# Patient Record
Sex: FEMALE | Race: WHITE | NOT HISPANIC OR LATINO
[De-identification: names, ages, dates, MRNs, and addresses within clinical notes are randomized per-mention and may not be internally consistent; named-entity substitution may affect disease eponyms.]

---

## 2017-12-08 ENCOUNTER — TRANSCRIPTION ENCOUNTER (OUTPATIENT)
Age: 82
End: 2017-12-08

## 2018-02-05 ENCOUNTER — TRANSCRIPTION ENCOUNTER (OUTPATIENT)
Age: 83
End: 2018-02-05

## 2018-03-30 ENCOUNTER — TRANSCRIPTION ENCOUNTER (OUTPATIENT)
Age: 83
End: 2018-03-30

## 2018-04-30 ENCOUNTER — APPOINTMENT (OUTPATIENT)
Dept: GERIATRICS | Facility: CLINIC | Age: 83
End: 2018-04-30
Payer: MEDICARE

## 2018-04-30 VITALS
SYSTOLIC BLOOD PRESSURE: 110 MMHG | OXYGEN SATURATION: 98 % | DIASTOLIC BLOOD PRESSURE: 62 MMHG | HEART RATE: 75 BPM | BODY MASS INDEX: 21.34 KG/M2 | HEIGHT: 61 IN | WEIGHT: 113 LBS | TEMPERATURE: 98.6 F

## 2018-04-30 DIAGNOSIS — Z60.2 PROBLEMS RELATED TO LIVING ALONE: ICD-10-CM

## 2018-04-30 DIAGNOSIS — Z71.89 OTHER SPECIFIED COUNSELING: ICD-10-CM

## 2018-04-30 DIAGNOSIS — M17.10 UNILATERAL PRIMARY OSTEOARTHRITIS, UNSPECIFIED KNEE: ICD-10-CM

## 2018-04-30 DIAGNOSIS — M25.519 PAIN IN UNSPECIFIED SHOULDER: ICD-10-CM

## 2018-04-30 DIAGNOSIS — Z00.00 ENCOUNTER FOR GENERAL ADULT MEDICAL EXAMINATION W/OUT ABNORMAL FINDINGS: ICD-10-CM

## 2018-04-30 DIAGNOSIS — J45.909 UNSPECIFIED ASTHMA, UNCOMPLICATED: ICD-10-CM

## 2018-04-30 PROCEDURE — 99205 OFFICE O/P NEW HI 60 MIN: CPT | Mod: GC

## 2018-04-30 RX ORDER — RIVASTIGMINE 9.5 MG/24H
9.5 PATCH, EXTENDED RELEASE TRANSDERMAL
Refills: 0 | Status: ACTIVE | COMMUNITY
Start: 2018-04-30

## 2018-04-30 RX ORDER — LEVOTHYROXINE SODIUM 0.05 MG/1
50 TABLET ORAL DAILY
Refills: 0 | Status: ACTIVE | COMMUNITY
Start: 2018-04-30

## 2018-04-30 RX ORDER — FLUTICASONE PROPIONATE AND SALMETEROL 50; 250 UG/1; UG/1
250-50 POWDER RESPIRATORY (INHALATION)
Qty: 3 | Refills: 3 | Status: ACTIVE | COMMUNITY
Start: 2018-04-30

## 2018-04-30 RX ORDER — FLUTICASONE PROPIONATE 50 UG/1
50 SPRAY, METERED NASAL DAILY
Qty: 1 | Refills: 5 | Status: ACTIVE | COMMUNITY
Start: 2018-04-30

## 2018-04-30 RX ORDER — MONTELUKAST 10 MG/1
10 TABLET, FILM COATED ORAL
Qty: 1 | Refills: 1 | Status: ACTIVE | COMMUNITY
Start: 2018-04-30

## 2018-04-30 SDOH — SOCIAL STABILITY - SOCIAL INSECURITY: PROBLEMS RELATED TO LIVING ALONE: Z60.2

## 2018-05-01 PROBLEM — M17.10 ARTHRITIS OF KNEE: Status: RESOLVED | Noted: 2018-05-01 | Resolved: 2018-05-01

## 2018-05-01 PROBLEM — Z60.2 LIVING ALONE: Status: ACTIVE | Noted: 2018-05-01

## 2018-05-01 PROBLEM — M25.519 SHOULDER PAIN: Status: RESOLVED | Noted: 2018-05-01 | Resolved: 2018-05-01

## 2018-06-06 ENCOUNTER — APPOINTMENT (OUTPATIENT)
Dept: GERIATRICS | Facility: CLINIC | Age: 83
End: 2018-06-06
Payer: MEDICARE

## 2018-06-06 ENCOUNTER — TRANSCRIPTION ENCOUNTER (OUTPATIENT)
Age: 83
End: 2018-06-06

## 2018-06-06 VITALS
HEIGHT: 61 IN | OXYGEN SATURATION: 98 % | RESPIRATION RATE: 15 BRPM | HEART RATE: 71 BPM | WEIGHT: 111.38 LBS | BODY MASS INDEX: 21.03 KG/M2 | TEMPERATURE: 97.4 F | DIASTOLIC BLOOD PRESSURE: 60 MMHG | SYSTOLIC BLOOD PRESSURE: 120 MMHG

## 2018-06-06 DIAGNOSIS — E63.9 NUTRITIONAL DEFICIENCY, UNSPECIFIED: ICD-10-CM

## 2018-06-06 PROCEDURE — 99215 OFFICE O/P EST HI 40 MIN: CPT

## 2018-06-07 PROBLEM — E63.9 POOR DIET: Status: ACTIVE | Noted: 2018-04-30

## 2018-06-27 ENCOUNTER — OUTPATIENT (OUTPATIENT)
Dept: OUTPATIENT SERVICES | Facility: HOSPITAL | Age: 83
LOS: 1 days | End: 2018-06-27
Payer: MEDICARE

## 2018-06-27 ENCOUNTER — APPOINTMENT (OUTPATIENT)
Dept: CT IMAGING | Facility: IMAGING CENTER | Age: 83
End: 2018-06-27

## 2018-06-27 DIAGNOSIS — Z00.8 ENCOUNTER FOR OTHER GENERAL EXAMINATION: ICD-10-CM

## 2018-06-27 PROCEDURE — 74177 CT ABD & PELVIS W/CONTRAST: CPT

## 2018-06-27 PROCEDURE — 74177 CT ABD & PELVIS W/CONTRAST: CPT | Mod: 26

## 2018-06-27 PROCEDURE — 82565 ASSAY OF CREATININE: CPT

## 2018-06-29 ENCOUNTER — APPOINTMENT (OUTPATIENT)
Dept: ORTHOPEDIC SURGERY | Facility: CLINIC | Age: 83
End: 2018-06-29
Payer: MEDICARE

## 2018-06-29 VITALS
DIASTOLIC BLOOD PRESSURE: 75 MMHG | BODY MASS INDEX: 20.96 KG/M2 | HEIGHT: 61 IN | SYSTOLIC BLOOD PRESSURE: 147 MMHG | WEIGHT: 111 LBS | HEART RATE: 72 BPM

## 2018-06-29 PROCEDURE — 99214 OFFICE O/P EST MOD 30 MIN: CPT | Mod: 25

## 2018-06-29 PROCEDURE — 20610 DRAIN/INJ JOINT/BURSA W/O US: CPT | Mod: 50

## 2018-07-22 PROBLEM — J45.909 HAY FEVER WITH ASTHMA: Status: ACTIVE | Noted: 2018-04-30

## 2018-07-28 ENCOUNTER — HOSPITAL ENCOUNTER (EMERGENCY)
Dept: HOSPITAL 25 - UCCORT | Age: 83
Discharge: HOME | End: 2018-07-28
Payer: MEDICARE

## 2018-07-28 DIAGNOSIS — H00.021: ICD-10-CM

## 2018-07-28 DIAGNOSIS — F03.90: ICD-10-CM

## 2018-07-28 DIAGNOSIS — H00.11: Primary | ICD-10-CM

## 2018-07-28 PROCEDURE — G0463 HOSPITAL OUTPT CLINIC VISIT: HCPCS

## 2018-07-28 PROCEDURE — 99203 OFFICE O/P NEW LOW 30 MIN: CPT

## 2018-07-28 NOTE — UC
Eye Complaint HPI





- HPI Summary


HPI Summary: 





PT AND HER DAUGHTER ARE VISITING [place]. PT NOTED SOME ITCHING TO HER RIGHT 

UPPER EYE LID ABOUT 1 WEEK AGO. SHE TRIED TO SELF TX WITH OTC ALLERGY EYE DROPS 

WITH NO RELIEF. OVER THE PAST 2-3 DAYS, SHE NOTED A LITTLE BUMP AND RED SPOT TO 

HER UPPER OUTER LID THAT IS WORSENING. NO EYE PAIN, VISUAL CHANGES OR DISCHARGE.





- History of Current Complaint


Stated Complaint: LEFT EYE COMPLAINT


Time Seen by Provider: 07/28/18 20:13


Hx Obtained From: Patient, Family/Caretaker


Onset/Duration: Gradual Onset


Timing: Constant


Aggravating Factor(s): Nothing


Alleviating Factor(s): Nothing


Associated Signs And Symptoms: Negative: Photophobia, Drainage (Clear), 

Drainage (Purulent), Vision Impairment Bilateral





- Risk Factors


Penetrating Injury Risk Factor: Negative


Globe Rupture Risk Factors: Negative


Acute Glaucoma Risk Factors: Negative





- Allergies/Home Medications


Allergies/Adverse Reactions: 


 Allergies











Allergy/AdvReac Type Severity Reaction Status Date / Time


 


environmental allergies Allergy  Eyes Uncoded 07/28/18 20:25





   Itchy/Swollen/Red/Watery  











Home Medications: 


 Home Medications





Acetaminophen TAB* [Tylenol TAB*] 325 mg PO Q4H PRN 07/28/18 [History Confirmed 

07/28/18]


Albuterol HFA INHALER* [Ventolin HFA Inhaler*] 2 puff INH Q6H PRN 07/28/18 [

History Confirmed 07/28/18]


Ascorbic Acid TAB* [Vitamin C  TAB*] 500 mg PO DAILY 07/28/18 [History 

Confirmed 07/28/18]


Calcium Carb,Gluc/Mag Ox,Gluc [Calcium Magnesium Caplet] 1 each PO DAILY 07/28/ 18 [History Confirmed 07/28/18]


Cyanocobalamin (Vitamin B-12) [Vitamin B-12] 1,000 mcg PO DAILY 07/28/18 [

History Confirmed 07/28/18]


Desloratidine (NF) [Clarinex (NF)] 5 mg PO DAILY 07/28/18 [History Confirmed 07/ 28/18]


Fluticasone NASAL SPRAY 50MCG* [Flonase NASAL SPRAY 50MCG*] 2 spray BOTH NARES 

DAILY 07/28/18 [History Confirmed 07/28/18]


Fluticasone-Salmeterol 100-50* [Advair Diskus 100-50*] 1 puff INH BID 07/28/18 [

History Confirmed 07/28/18]


L.acidoph,Paracasei, B.lactis [Probiotic] 1 each PO DAILY 07/28/18 [History 

Confirmed 07/28/18]


Montelukast Sodium TAB* [Singulair TAB*] 10 mg PO DAILY 07/28/18 [History 

Confirmed 07/28/18]


Multivit/Iron/Folic Acid/Hb179 [Rylan Multi For Women Tab] 1 each PO DAILY 07/28/ 18 [History Confirmed 07/28/18]


Naphazoline/Pheniramine OPTH* [Naphcon-A*] 1 udc RIGHT EYE QID PRN 07/28/18 [

History Confirmed 07/28/18]


Omega-3 Fatty Acids/Fish Oil [Fish Oil 1,000 mg Capsule] 1 each PO DAILY 07/28/ 18 [History Confirmed 07/28/18]


Potassium 99 mg PO DAILY 07/28/18 [History Confirmed 07/28/18]


Pseudoephedrine TAB* [Sudafed TAB*] 30 mg PO Q6H PRN 07/28/18 [History 

Confirmed 07/28/18]


Red Yeast Rice 600 mg PO DAILY 07/28/18 [History Confirmed 07/28/18]


Rivastigmine PATCH 9.5 MG(NF) [Exelon PATCH(NF)] 1 patch TRANSDERM DAILY 07/28/ 18 [History Confirmed 07/28/18]


Vitamin B Complex CAP* [B Complex CAP*] 1 cap PO DAILY 07/28/18 [History 

Confirmed 07/28/18]











PMH/Surg Hx/FS Hx/Imm Hx





- Additional Past Medical History


Additional PMH: 





Allergies. Mild dementia





- Surgical History


Other Surgical History: cataracts





- Social History


Occupation: Retired


Lives: With Family





- Immunization History


Vaccination Up to Date: Yes





Review of Systems


Constitutional: Negative


Skin: Negative


Eyes: Other - R upper lid red and swelling


ENT: Negative


Respiratory: Negative


Cardiovascular: Negative


Gastrointestinal: Negative


Genitourinary: Negative


Motor: Negative


Neurovascular: Negative


Musculoskeletal: Negative


Neurological: Negative


Psychological: Negative


Is Patient Immunocompromised?: No


All Other Systems Reviewed And Are Negative: Yes





Physical Exam


Triage Information Reviewed: Yes


Appearance: Well-Appearing


Vital Signs Reviewed: Yes


Eyes: Positive: Other: - R upper lateral lid with small area of swelling and 

mild local erythema. There is no circumfrential swelling or erythema.  lids 

everted and no FB's. AC's clear. conjunctiva are clear. pupils s/p cataract. 

eomi. no auricular adenopathy.


ENT: Positive: Pharynx normal, TMs normal, Other - hearing aides removed and re[

laced by pt for ear exams..  Negative: Nasal congestion, Nasal drainage


Neck: Positive: Supple, Nontender, No Lymphadenopathy


Respiratory: Positive: Lungs clear, Normal breath sounds


Cardiovascular: Positive: RRR, No Murmur


Abdomen Description: Positive: Nontender, No Organomegaly, Soft


Bowel Sounds: Positive: Present


Musculoskeletal: Positive: ROM Intact


Neurological: Positive: Alert


Psychological: Positive: Normal Response To Family, Age Appropriate Behavior


Skin Exam: Normal





Eye Complaint Course/Dx





- Course


Course Of Treatment: no concern for orbital cellulitis. exam is c/w a chalazion 

and very localized secondary skin infecion. will tx with topical and po 

antibiotics. pt and daughter agree to stop the otc drops since not helping.  pt 

in area for about 7-10 days thus referal given to a local eye physician for a 

recheck.  pt and daughter advise, go to ER for changes or worsening.





- Differential Dx/Diagnosis


Provider Diagnoses: Chalazion R upper lid with secondary local infection.





Discharge





- Sign-Out/Discharge


Documenting (check all that apply): Patient Departure





- Discharge Plan


Condition: Stable


Disposition: HOME


Prescriptions: 


Amoxicillin/Clavulanate TAB* [Augmentin *] 875 mg PO BID #13 tab


Patient Education Materials:  Chalazion (ED)


Referrals: 


Ariana Alonzo MD [Medical Doctor] - 


Additional Instructions: 


CALL THE OFFICE OF DR ALONZO AND MAKE AN APPOINTMENT FOR A RECHECK IN 2-3 

DAYS.





GO TO THE ER FOR ANY WORSENING.





USE A TINY STRIP OF THE ERYTHROMYCIN EYE OINTMENT IN THE RIGHT LOWER LID 3X'S 

DAILY FOR 7 DAYS.





- Billing Disposition and Condition


Condition: STABLE


Disposition: Home

## 2018-08-06 ENCOUNTER — APPOINTMENT (OUTPATIENT)
Dept: GERIATRICS | Facility: CLINIC | Age: 83
End: 2018-08-06
Payer: MEDICARE

## 2018-08-06 ENCOUNTER — APPOINTMENT (OUTPATIENT)
Dept: ORTHOPEDIC SURGERY | Facility: CLINIC | Age: 83
End: 2018-08-06

## 2018-08-06 VITALS
HEIGHT: 61 IN | RESPIRATION RATE: 15 BRPM | TEMPERATURE: 97.6 F | OXYGEN SATURATION: 97 % | SYSTOLIC BLOOD PRESSURE: 100 MMHG | WEIGHT: 110 LBS | HEART RATE: 69 BPM | BODY MASS INDEX: 20.77 KG/M2 | DIASTOLIC BLOOD PRESSURE: 60 MMHG

## 2018-08-06 DIAGNOSIS — H00.11 CHALAZION RIGHT UPPER EYELID: ICD-10-CM

## 2018-08-06 PROCEDURE — 99214 OFFICE O/P EST MOD 30 MIN: CPT | Mod: GC

## 2018-08-06 RX ORDER — DOXYCYCLINE HYCLATE 100 MG/1
100 CAPSULE ORAL
Qty: 20 | Refills: 0 | Status: DISCONTINUED | COMMUNITY
Start: 2018-02-05 | End: 2018-08-06

## 2018-08-06 RX ORDER — DESLORATADINE 5 MG/1
5 TABLET, FILM COATED ORAL DAILY
Refills: 0 | Status: ACTIVE | COMMUNITY
Start: 2018-04-30

## 2018-08-06 RX ORDER — PSEUDOEPHEDRINE HCL 30 MG
30 TABLET ORAL
Refills: 0 | Status: DISCONTINUED | COMMUNITY
Start: 2018-04-30 | End: 2018-08-06

## 2018-08-06 RX ORDER — ALBUTEROL SULFATE 90 UG/1
108 (90 BASE) AEROSOL, METERED RESPIRATORY (INHALATION)
Qty: 1 | Refills: 0 | Status: ACTIVE | COMMUNITY
Start: 2018-04-30

## 2018-08-07 LAB
ALBUMIN SERPL ELPH-MCNC: 4.3 G/DL
ALP BLD-CCNC: 60 U/L
ALT SERPL-CCNC: 11 U/L
ANION GAP SERPL CALC-SCNC: 14 MMOL/L
AST SERPL-CCNC: 20 U/L
BASOPHILS # BLD AUTO: 0.02 K/UL
BASOPHILS NFR BLD AUTO: 0.3 %
BILIRUB SERPL-MCNC: 0.4 MG/DL
BUN SERPL-MCNC: 21 MG/DL
CALCIUM SERPL-MCNC: 9.5 MG/DL
CHLORIDE SERPL-SCNC: 106 MMOL/L
CHOLEST SERPL-MCNC: 244 MG/DL
CHOLEST/HDLC SERPL: 3.4 RATIO
CO2 SERPL-SCNC: 26 MMOL/L
CREAT SERPL-MCNC: 0.81 MG/DL
EOSINOPHIL # BLD AUTO: 0.04 K/UL
EOSINOPHIL NFR BLD AUTO: 0.6 %
FOLATE SERPL-MCNC: 15.2 NG/ML
GLUCOSE SERPL-MCNC: 97 MG/DL
HBA1C MFR BLD HPLC: 5.6 %
HCT VFR BLD CALC: 39 %
HDLC SERPL-MCNC: 72 MG/DL
HGB BLD-MCNC: 12.9 G/DL
IMM GRANULOCYTES NFR BLD AUTO: 0.6 %
LDLC SERPL CALC-MCNC: 156 MG/DL
LYMPHOCYTES # BLD AUTO: 1.35 K/UL
LYMPHOCYTES NFR BLD AUTO: 20 %
MAN DIFF?: NORMAL
MCHC RBC-ENTMCNC: 29.7 PG
MCHC RBC-ENTMCNC: 33.1 GM/DL
MCV RBC AUTO: 89.9 FL
MONOCYTES # BLD AUTO: 0.92 K/UL
MONOCYTES NFR BLD AUTO: 13.6 %
NEUTROPHILS # BLD AUTO: 4.39 K/UL
NEUTROPHILS NFR BLD AUTO: 64.9 %
PLATELET # BLD AUTO: 186 K/UL
POTASSIUM SERPL-SCNC: 4.5 MMOL/L
PROT SERPL-MCNC: 6.6 G/DL
RBC # BLD: 4.34 M/UL
RBC # FLD: 14.9 %
SODIUM SERPL-SCNC: 146 MMOL/L
TRIGL SERPL-MCNC: 79 MG/DL
TSH SERPL-ACNC: 2.27 UIU/ML
VIT B12 SERPL-MCNC: 658 PG/ML
WBC # FLD AUTO: 6.76 K/UL

## 2018-10-24 ENCOUNTER — APPOINTMENT (OUTPATIENT)
Dept: GERIATRICS | Facility: CLINIC | Age: 83
End: 2018-10-24

## 2019-01-08 ENCOUNTER — APPOINTMENT (OUTPATIENT)
Dept: ORTHOPEDIC SURGERY | Facility: CLINIC | Age: 84
End: 2019-01-08
Payer: MEDICARE

## 2019-01-08 VITALS — HEIGHT: 61 IN | WEIGHT: 110 LBS | BODY MASS INDEX: 20.77 KG/M2

## 2019-01-08 DIAGNOSIS — M19.012 PRIMARY OSTEOARTHRITIS, LEFT SHOULDER: ICD-10-CM

## 2019-01-08 PROCEDURE — 20610 DRAIN/INJ JOINT/BURSA W/O US: CPT | Mod: LT

## 2019-01-08 PROCEDURE — 99213 OFFICE O/P EST LOW 20 MIN: CPT | Mod: 25

## 2019-01-08 NOTE — DISCUSSION/SUMMARY
[de-identified] : The underlying pathophysiology was reviewed in great detail with the patient as well as the various treatment options, including ice, analgesics, NSAIDs, Physical therapy, steroid injections, TSA.\par \par The patient wishes to proceed with an INJECTION of the left shoulder.\par \par FU 2-3 weeks for corticosteroid injections of the knees bilaterally.

## 2019-01-08 NOTE — PHYSICAL EXAM
[Normal RUE] : Right Upper Extremity: No scars, rashes, lesions, ulcers, skin intact [Normal LUE] : Left Upper Extremity: No scars, rashes, lesions, ulcers, skin intact [Normal Touch] : sensation intact for touch [Normal] : No swelling, no edema, normal pedal pulses and normal temperature [Poor Appearance] : well-appearing [Acute Distress] : not in acute distress [Obese] : not obese [de-identified] : Left Upper Extremity\par o Shoulder :\par ¦ Inspection/Palpation : no tenderness to palpation, no swelling, no deformity \par ¦ Range of Motion : ACTIVE FORWARD ELEVATION: Measured at 70 degrees, ACTIVE EXTERNAL ROTATION: Measured at 25 degrees, ACTIVE INTERNAL ROTATION: Measured at L1. Crepitus on ROM. \par ¦ Strength : external rotation 5/5, internal rotation 5/5, supraspinatus 5/5 \par ¦ Stability : no joint instability on provocative testing\par o Upper Arm : no tenderness, no swelling, no deformities\par o Muscle Bulk : no atrophy \par o Sensation : sensation intact to light touch \par o Skin : no skin rash, no discoloration \par o Vascular Exam : no edema, no cyanosis, radial and ulnar pulses normal

## 2019-01-08 NOTE — PROCEDURE
[de-identified] : At this point I recommended a therapeutic injection and under sterile precautions an injection of 4 cc 1% lidocaine with 0.5 cc of Kenalog and 0.5 cc of Dexamethasone- was placed into the glenohumeral joint of the Left shoulder without complication, and after several minutes, the patient felt significant relief.

## 2019-01-08 NOTE — ADDENDUM
[FreeTextEntry1] : I, Bere Hagan, acted solely as a scribe for Dr. Dva Barkley on this date 01/08/2019 .

## 2019-01-08 NOTE — HISTORY OF PRESENT ILLNESS
[de-identified] : 83 year old female presents for an evaluation of left shoulder pain. She has been diagnosed with advanced osteoarthritis of the glenohumeral joint. Today she notes that her pain has worsened since her last visit and that her pain is of a constant aching quality and exacerbated with all movement of her left shoulder. She has been treated with corticosteroid injections in the past and states that they have provided her with relief from her symptoms. She is interested in receiving a corticosteroid injection of the left shoulder today.

## 2019-01-08 NOTE — END OF VISIT
[FreeTextEntry3] : All medical record entries made by the Arturoibtruman were at my, Dr. Dav Barkley, direction and personally dictated by me on 01/08/2019. I have reviewed the chart and agree that the record accurately reflects my personal performance of the history, physical exam, assessment and plan. I have also personally directed, reviewed, and agreed with the chart.

## 2019-01-31 ENCOUNTER — TRANSCRIPTION ENCOUNTER (OUTPATIENT)
Age: 84
End: 2019-01-31

## 2019-02-04 ENCOUNTER — APPOINTMENT (OUTPATIENT)
Dept: ORTHOPEDIC SURGERY | Facility: CLINIC | Age: 84
End: 2019-02-04
Payer: MEDICARE

## 2019-02-04 DIAGNOSIS — S81.811A LACERATION W/OUT FOREIGN BODY, RIGHT LOWER LEG, INITIAL ENCOUNTER: ICD-10-CM

## 2019-02-04 DIAGNOSIS — M17.0 BILATERAL PRIMARY OSTEOARTHRITIS OF KNEE: ICD-10-CM

## 2019-02-04 PROCEDURE — 99213 OFFICE O/P EST LOW 20 MIN: CPT

## 2019-02-04 NOTE — PHYSICAL EXAM
[Normal RUE] : Right Upper Extremity: No scars, rashes, lesions, ulcers, skin intact [Normal LUE] : Left Upper Extremity: No scars, rashes, lesions, ulcers, skin intact [Normal Touch] : sensation intact for touch [Normal] : No swelling, no edema, normal pedal pulses and normal temperature [Poor Appearance] : well-appearing [Acute Distress] : not in acute distress [Obese] : not obese [de-identified] : Right Lower Extremity\par o Tibia/Fibula :\par ¦ Inspection/Palpation : no tenderness to palpation, Swelling about the ankle, no deformity, skin tear along the anterior proximal tibia, no erythema or discharge\par ¦ Stability : no instability present on provocative testing\par o Muscle Bulk : normal muscle bulk present\par o Skin : erythema and ecchymosis surrounding the skin tear\par o Sensation : sensation to pin intact\par o Vascular Exam : no edema, no cyanosis, dorsalis pedis artery pulse 2+, posterior tibial artery pulse 2+

## 2019-02-04 NOTE — END OF VISIT
[FreeTextEntry3] : All medical record entries made by the Arturoibe were at my, Dr. Dav Barkley, direction and personally dictated by me on 02/04/2019. I have reviewed the chart and agree that the record accurately reflects my personal performance of the history, physical exam, assessment and plan. I have also personally directed, reviewed, and agreed with the chart.

## 2019-02-04 NOTE — HISTORY OF PRESENT ILLNESS
[de-identified] : 83 year old female presents for an evaluation of right leg pain, she is accompanied by her sister. She has been diagnosed with advanced osteoarthritis of the glenohumeral joint and at her last visit she received a corticosteroid injection of her left shoulder and reports some improvements in her symptoms. Pt notes that she fell on 2/1/2019 and sustained an impact to her lower leg that resulted in a skin tear. She notes swelling of her right lower leg though she is still able to walk and climb stairs without pain. Today she has the wound dressed and says that it has been healing well.

## 2019-02-04 NOTE — ADDENDUM
[FreeTextEntry1] : I, Bere Hagan, acted solely as a scribe for Dr. Dav Barkley on this date 02/04/2019 .

## 2019-02-04 NOTE — DISCUSSION/SUMMARY
[de-identified] : The underlying pathophysiology was reviewed in great detail with the patient as well as the various treatment options, including ice, analgesics, NSAIDs, Physical therapy, steroid injections, left TSA.\par \par Skin tear of the left lower leg was cleaned and dressed. She was instructed to keep it clean and dry while it is healing.\par \par FU PRN.

## 2019-02-06 ENCOUNTER — APPOINTMENT (OUTPATIENT)
Dept: GERIATRICS | Facility: CLINIC | Age: 84
End: 2019-02-06
Payer: MEDICARE

## 2019-02-06 VITALS
TEMPERATURE: 98.3 F | HEART RATE: 83 BPM | SYSTOLIC BLOOD PRESSURE: 120 MMHG | RESPIRATION RATE: 15 BRPM | WEIGHT: 112 LBS | DIASTOLIC BLOOD PRESSURE: 70 MMHG | OXYGEN SATURATION: 96 % | BODY MASS INDEX: 21.14 KG/M2 | HEIGHT: 61 IN

## 2019-02-06 DIAGNOSIS — E78.5 HYPERLIPIDEMIA, UNSPECIFIED: ICD-10-CM

## 2019-02-06 DIAGNOSIS — E03.9 HYPOTHYROIDISM, UNSPECIFIED: ICD-10-CM

## 2019-02-06 DIAGNOSIS — J45.40 MODERATE PERSISTENT ASTHMA, UNCOMPLICATED: ICD-10-CM

## 2019-02-06 DIAGNOSIS — G25.0 ESSENTIAL TREMOR: ICD-10-CM

## 2019-02-06 DIAGNOSIS — J30.89 OTHER ALLERGIC RHINITIS: ICD-10-CM

## 2019-02-06 DIAGNOSIS — T14.8XXA OTHER INJURY OF UNSPECIFIED BODY REGION, INITIAL ENCOUNTER: ICD-10-CM

## 2019-02-06 DIAGNOSIS — J30.2 OTHER ALLERGIC RHINITIS: ICD-10-CM

## 2019-02-06 PROCEDURE — 99214 OFFICE O/P EST MOD 30 MIN: CPT | Mod: GC

## 2019-02-06 RX ORDER — ATORVASTATIN CALCIUM 10 MG/1
10 TABLET, FILM COATED ORAL
Qty: 30 | Refills: 0 | Status: ACTIVE | COMMUNITY
Start: 2019-02-06 | End: 1900-01-01

## 2019-02-06 NOTE — HISTORY OF PRESENT ILLNESS
[0] : 2) Feeling down, depressed, or hopeless: Not at all [FreeTextEntry1] :  This is an 82 yo F patient who comes today with ousmane, her sister for follow up. She have PMH significant for Dementia, OA, hypothyroidism and asthma.\par \par Fall: Pt fell down on feb 3rd. She says that she walks daily and goes up and down the stair in her building. While she was exercising her foot got caught in one of the stair and she fell. She denies trauma to head but she got lacerations in her Right upper and lower ext. She was taken to the Urgent care where wound care was done and she was prescribed with keflex 500 q 8 hrs for 5 days, today is day 3.  She denies dizziness or palpitation at the moment of the fall. \par \par Tremors: Patient has left hand tremors for several years,   As per sister the tremor is getting worse, is just in the left hand and intentional tremor. It worsen as the day progress, however patient says that it really does not bother her and she is able to do her daily activities with the tremors; she says that she would prefer not to be started on any medication unless she really need it and relates the tremors with her shoulder pain. Her father had also tremors.\par \par she was seen by PMD/cardio last month and labs showed high cholesterol so started on statin.

## 2019-02-06 NOTE — ASSESSMENT
[FreeTextEntry1] : adv to get labresults fax here to review [Minimize falling] : use grab bars, anti- slip rugs, and proper shoes to minimize falling [Daily physical exercise as tolerated] : Daily physical exercise as tolerated [Medication Management] : medication management [Lab Results] : lab results

## 2019-02-06 NOTE — REASON FOR VISIT
[Follow-Up] : a follow-up visit [Family Member] : family member [Pre-Visit Preparation] : pre-visit preparation was not done [Intercurrent Specialty/Sub-specialty Visits] : the patient has no intercurrent specialty/sub-specialty visits

## 2019-02-06 NOTE — PHYSICAL EXAM
[General Appearance - Alert] : alert [General Appearance - In No Acute Distress] : in no acute distress [Heart Rate And Rhythm] : heart rate was normal and rhythm regular [Heart Sounds] : normal S1 and S2 [Bowel Sounds] : normal bowel sounds [Abdomen Soft] : soft [Abdomen Tenderness] : non-tender [Respiration, Rhythm And Depth] : normal respiratory rhythm and effort [Auscultation Breath Sounds / Voice Sounds] : lungs were clear to auscultation bilaterally [No Focal Deficits] : no focal deficits [Sclera] : the sclera and conjunctiva were normal [Normal Oral Mucosa] : normal oral mucosa [Neck Appearance] : the appearance of the neck was normal [Edema] : there was no peripheral edema [No Spinal Tenderness] : no spinal tenderness [Affect] : the affect was normal [FreeTextEntry1] : left hand intentional tremors

## 2019-02-25 ENCOUNTER — TRANSCRIPTION ENCOUNTER (OUTPATIENT)
Age: 84
End: 2019-02-25

## 2019-03-11 ENCOUNTER — TRANSCRIPTION ENCOUNTER (OUTPATIENT)
Age: 84
End: 2019-03-11

## 2019-03-12 ENCOUNTER — APPOINTMENT (OUTPATIENT)
Dept: GERIATRICS | Facility: CLINIC | Age: 84
End: 2019-03-12
Payer: MEDICARE

## 2019-03-12 VITALS
OXYGEN SATURATION: 95 % | DIASTOLIC BLOOD PRESSURE: 70 MMHG | HEART RATE: 83 BPM | BODY MASS INDEX: 21.14 KG/M2 | TEMPERATURE: 97.6 F | SYSTOLIC BLOOD PRESSURE: 120 MMHG | WEIGHT: 112 LBS | RESPIRATION RATE: 15 BRPM | HEIGHT: 61 IN

## 2019-03-12 DIAGNOSIS — R41.82 ALTERED MENTAL STATUS, UNSPECIFIED: ICD-10-CM

## 2019-03-12 DIAGNOSIS — F03.90 UNSPECIFIED DEMENTIA W/OUT BEHAVIORAL DISTURBANCE: ICD-10-CM

## 2019-03-12 LAB
ALBUMIN SERPL ELPH-MCNC: 4.5 G/DL
ALP BLD-CCNC: 70 U/L
ALT SERPL-CCNC: 14 U/L
ANION GAP SERPL CALC-SCNC: 12 MMOL/L
AST SERPL-CCNC: 21 U/L
BILIRUB SERPL-MCNC: 0.5 MG/DL
BUN SERPL-MCNC: 23 MG/DL
CALCIUM SERPL-MCNC: 9.7 MG/DL
CHLORIDE SERPL-SCNC: 106 MMOL/L
CO2 SERPL-SCNC: 27 MMOL/L
CREAT SERPL-MCNC: 0.83 MG/DL
GLUCOSE SERPL-MCNC: 104 MG/DL
POTASSIUM SERPL-SCNC: 4.1 MMOL/L
PROT SERPL-MCNC: 7 G/DL
SODIUM SERPL-SCNC: 145 MMOL/L

## 2019-03-12 PROCEDURE — 99214 OFFICE O/P EST MOD 30 MIN: CPT

## 2019-03-12 RX ORDER — CEPHALEXIN 500 MG/1
500 CAPSULE ORAL 3 TIMES DAILY
Qty: 15 | Refills: 0 | Status: COMPLETED | COMMUNITY
Start: 2019-02-03 | End: 2019-03-12

## 2019-03-12 NOTE — PHYSICAL EXAM
[General Appearance - Alert] : alert [General Appearance - In No Acute Distress] : in no acute distress [Sclera] : the sclera and conjunctiva were normal [Neck Appearance] : the appearance of the neck was normal [Respiration, Rhythm And Depth] : normal respiratory rhythm and effort [Auscultation Breath Sounds / Voice Sounds] : lungs were clear to auscultation bilaterally [Heart Rate And Rhythm] : heart rate was normal and rhythm regular [Heart Sounds] : normal S1 and S2 [Edema] : there was no peripheral edema [Bowel Sounds] : normal bowel sounds [Abdomen Soft] : soft [Abdomen Tenderness] : non-tender [No Focal Deficits] : no focal deficits [Extraocular Movements] : extraocular movements were intact [Outer Ear] : the ears and nose were normal in appearance [No CVA Tenderness] : no ~M costovertebral angle tenderness [Involuntary Movements] : no involuntary movements were seen [Motor Tone] : muscle strength and tone were normal [Skin Color & Pigmentation] : normal skin color and pigmentation [] : no rash [Hearing Threshold Finger Rub Not [place]] : hearing was normal [FreeTextEntry1] : oral mucosa dry

## 2019-03-12 NOTE — HISTORY OF PRESENT ILLNESS
[FreeTextEntry1] : 84 y/o female with PMH of dementia without behavior disturbance presenting with close friend Anup due to concern for acute change in mental status over the weekend. Sister present over the phone. \par \par Her sister called over the weekend because she called her mom and patient was asking questions as to where she is, what day it is, and had repetition of all questions and 'stuck on one subject'. She was a 'bad episode of confusion'. She was advised to make an appt and come in. As per family friend, she has gone to back to baseline. No trauma.  Has been eating and drinking well. No urinary symptoms. No URI symptoms. No diarrhea or constipation. No rash or ulcers.\par \par Of note, she was treated for UTI with keflex  500 q 8 hrs for 5 days in Feb. \par \par Pt agitated, has an aide with her, but pt annoyed we are here visiting with pt.

## 2019-03-12 NOTE — ASSESSMENT
[Regular activities] : regular physical, social and mental activities [Minimize falling] : use grab bars, anti- slip rugs, and proper shoes to minimize falling [Medication Management] : medication management [FreeTextEntry1] : 84 yo female here bc of change in mental status over past thrreee days but now pt at baseline.Pt rerusing help and wants to leave. No pain, cough, sob or UTI symptomes.\par \par 1) Dementia with behavioral change - check labs, associeated with UTI? CHeck uringe for tx\par 2) Urinary frequence - check urne and labs \par 3) Falls - pt has new age working 2 days ago\par 4) Home safety - pt too agitated to do MMSE today. SHould repeat in future. Pt is alone for sometime, pt will need assistance at home, but refusing.  COns[facility] Assistant living. Only family [place] out of state.\par \par Will discuss with Dr. Green.  Await lab results. [FreeTextEntry4] : Not discussed on today's visit.

## 2019-03-13 LAB
BASOPHILS # BLD AUTO: 0.05 K/UL
BASOPHILS NFR BLD AUTO: 0.6 %
EOSINOPHIL # BLD AUTO: 0.02 K/UL
EOSINOPHIL NFR BLD AUTO: 0.2 %
HCT VFR BLD CALC: 44.4 %
HGB BLD-MCNC: 13.9 G/DL
IMM GRANULOCYTES NFR BLD AUTO: 0.9 %
LYMPHOCYTES # BLD AUTO: 1.45 K/UL
LYMPHOCYTES NFR BLD AUTO: 16.9 %
MAN DIFF?: NORMAL
MCHC RBC-ENTMCNC: 29.1 PG
MCHC RBC-ENTMCNC: 31.3 GM/DL
MCV RBC AUTO: 93.1 FL
MONOCYTES # BLD AUTO: 0.78 K/UL
MONOCYTES NFR BLD AUTO: 9.1 %
NEUTROPHILS # BLD AUTO: 6.19 K/UL
NEUTROPHILS NFR BLD AUTO: 72.3 %
PLATELET # BLD AUTO: 291 K/UL
RBC # BLD: 4.77 M/UL
RBC # FLD: 13.7 %
WBC # FLD AUTO: 8.57 K/UL

## 2019-03-16 ENCOUNTER — TRANSCRIPTION ENCOUNTER (OUTPATIENT)
Age: 84
End: 2019-03-16

## 2019-03-16 ENCOUNTER — INPATIENT (INPATIENT)
Facility: HOSPITAL | Age: 84
LOS: 1 days | Discharge: HOME | DRG: 314 | End: 2019-03-18
Attending: HOSPITALIST | Admitting: HOSPITALIST
Payer: MEDICARE

## 2019-03-16 VITALS
TEMPERATURE: 98 F | DIASTOLIC BLOOD PRESSURE: 82 MMHG | HEART RATE: 77 BPM | SYSTOLIC BLOOD PRESSURE: 140 MMHG | RESPIRATION RATE: 20 BRPM | OXYGEN SATURATION: 97 %

## 2019-03-16 DIAGNOSIS — R07.9 CHEST PAIN, UNSPECIFIED: ICD-10-CM

## 2019-03-16 LAB
ALBUMIN SERPL ELPH-MCNC: 4.3 G/DL — SIGNIFICANT CHANGE UP (ref 3.3–5)
ALP SERPL-CCNC: 68 U/L — SIGNIFICANT CHANGE UP (ref 40–120)
ALT FLD-CCNC: 14 U/L — SIGNIFICANT CHANGE UP (ref 10–45)
ANION GAP SERPL CALC-SCNC: 13 MMOL/L — SIGNIFICANT CHANGE UP (ref 5–17)
APPEARANCE UR: CLEAR — SIGNIFICANT CHANGE UP
APTT BLD: 26.4 SEC — LOW (ref 27.5–36.3)
AST SERPL-CCNC: 20 U/L — SIGNIFICANT CHANGE UP (ref 10–40)
BACTERIA # UR AUTO: NEGATIVE — SIGNIFICANT CHANGE UP
BASOPHILS # BLD AUTO: 0.1 K/UL — SIGNIFICANT CHANGE UP (ref 0–0.2)
BASOPHILS NFR BLD AUTO: 0.8 % — SIGNIFICANT CHANGE UP (ref 0–2)
BILIRUB SERPL-MCNC: 0.6 MG/DL — SIGNIFICANT CHANGE UP (ref 0.2–1.2)
BILIRUB UR-MCNC: NEGATIVE — SIGNIFICANT CHANGE UP
BUN SERPL-MCNC: 19 MG/DL — SIGNIFICANT CHANGE UP (ref 7–23)
CALCIUM SERPL-MCNC: 9.8 MG/DL — SIGNIFICANT CHANGE UP (ref 8.4–10.5)
CHLORIDE SERPL-SCNC: 106 MMOL/L — SIGNIFICANT CHANGE UP (ref 96–108)
CO2 SERPL-SCNC: 24 MMOL/L — SIGNIFICANT CHANGE UP (ref 22–31)
COLOR SPEC: YELLOW — SIGNIFICANT CHANGE UP
CREAT SERPL-MCNC: 0.8 MG/DL — SIGNIFICANT CHANGE UP (ref 0.5–1.3)
DIFF PNL FLD: NEGATIVE — SIGNIFICANT CHANGE UP
EOSINOPHIL # BLD AUTO: 0.1 K/UL — SIGNIFICANT CHANGE UP (ref 0–0.5)
EOSINOPHIL NFR BLD AUTO: 0.8 % — SIGNIFICANT CHANGE UP (ref 0–6)
EPI CELLS # UR: 4 /HPF — SIGNIFICANT CHANGE UP
GLUCOSE SERPL-MCNC: 106 MG/DL — HIGH (ref 70–99)
GLUCOSE UR QL: NEGATIVE — SIGNIFICANT CHANGE UP
HCT VFR BLD CALC: 41.7 % — SIGNIFICANT CHANGE UP (ref 34.5–45)
HGB BLD-MCNC: 13.8 G/DL — SIGNIFICANT CHANGE UP (ref 11.5–15.5)
HYALINE CASTS # UR AUTO: 5 /LPF — HIGH (ref 0–2)
INR BLD: 1.03 RATIO — SIGNIFICANT CHANGE UP (ref 0.88–1.16)
KETONES UR-MCNC: NEGATIVE — SIGNIFICANT CHANGE UP
LEUKOCYTE ESTERASE UR-ACNC: ABNORMAL
LYMPHOCYTES # BLD AUTO: 2 K/UL — SIGNIFICANT CHANGE UP (ref 1–3.3)
LYMPHOCYTES # BLD AUTO: 21.2 % — SIGNIFICANT CHANGE UP (ref 13–44)
MCHC RBC-ENTMCNC: 29.8 PG — SIGNIFICANT CHANGE UP (ref 27–34)
MCHC RBC-ENTMCNC: 33 GM/DL — SIGNIFICANT CHANGE UP (ref 32–36)
MCV RBC AUTO: 90.2 FL — SIGNIFICANT CHANGE UP (ref 80–100)
MONOCYTES # BLD AUTO: 0.9 K/UL — SIGNIFICANT CHANGE UP (ref 0–0.9)
MONOCYTES NFR BLD AUTO: 9.8 % — SIGNIFICANT CHANGE UP (ref 2–14)
NEUTROPHILS # BLD AUTO: 6.2 K/UL — SIGNIFICANT CHANGE UP (ref 1.8–7.4)
NEUTROPHILS NFR BLD AUTO: 67.4 % — SIGNIFICANT CHANGE UP (ref 43–77)
NITRITE UR-MCNC: NEGATIVE — SIGNIFICANT CHANGE UP
PH UR: 5.5 — SIGNIFICANT CHANGE UP (ref 5–8)
PLATELET # BLD AUTO: 244 K/UL — SIGNIFICANT CHANGE UP (ref 150–400)
POTASSIUM SERPL-MCNC: 4.1 MMOL/L — SIGNIFICANT CHANGE UP (ref 3.5–5.3)
POTASSIUM SERPL-SCNC: 4.1 MMOL/L — SIGNIFICANT CHANGE UP (ref 3.5–5.3)
PROT SERPL-MCNC: 6.9 G/DL — SIGNIFICANT CHANGE UP (ref 6–8.3)
PROT UR-MCNC: ABNORMAL
PROTHROM AB SERPL-ACNC: 11.9 SEC — SIGNIFICANT CHANGE UP (ref 10–12.9)
RBC # BLD: 4.62 M/UL — SIGNIFICANT CHANGE UP (ref 3.8–5.2)
RBC # FLD: 12.3 % — SIGNIFICANT CHANGE UP (ref 10.3–14.5)
RBC CASTS # UR COMP ASSIST: 4 /HPF — SIGNIFICANT CHANGE UP (ref 0–4)
SODIUM SERPL-SCNC: 143 MMOL/L — SIGNIFICANT CHANGE UP (ref 135–145)
SP GR SPEC: 1.03 — HIGH (ref 1.01–1.02)
TROPONIN T, HIGH SENSITIVITY RESULT: 10 NG/L — SIGNIFICANT CHANGE UP (ref 0–51)
UROBILINOGEN FLD QL: NEGATIVE — SIGNIFICANT CHANGE UP
WBC # BLD: 9.2 K/UL — SIGNIFICANT CHANGE UP (ref 3.8–10.5)
WBC # FLD AUTO: 9.2 K/UL — SIGNIFICANT CHANGE UP (ref 3.8–10.5)
WBC UR QL: 9 /HPF — HIGH (ref 0–5)

## 2019-03-16 PROCEDURE — 71045 X-RAY EXAM CHEST 1 VIEW: CPT | Mod: 26

## 2019-03-16 PROCEDURE — 99285 EMERGENCY DEPT VISIT HI MDM: CPT

## 2019-03-16 RX ORDER — ASPIRIN/CALCIUM CARB/MAGNESIUM 324 MG
324 TABLET ORAL ONCE
Qty: 0 | Refills: 0 | Status: COMPLETED | OUTPATIENT
Start: 2019-03-16 | End: 2019-03-16

## 2019-03-16 RX ORDER — ASPIRIN/CALCIUM CARB/MAGNESIUM 324 MG
325 TABLET ORAL ONCE
Qty: 0 | Refills: 0 | Status: COMPLETED | OUTPATIENT
Start: 2019-03-16 | End: 2019-03-16

## 2019-03-16 RX ADMIN — Medication 324 MILLIGRAM(S): at 22:11

## 2019-03-16 NOTE — ED PROVIDER NOTE - CLINICAL SUMMARY MEDICAL DECISION MAKING FREE TEXT BOX
nancy pt with cp this am seen at urgent care baseline dementia as per sister - ekg at urgent care with ford inv v1v2 and st dep v4-6 now normalized no pain in ed -- pt lives on er home sister concerned for her ability to care for self willadmit r/o acs , social work

## 2019-03-16 NOTE — ED ADULT NURSE NOTE - OBJECTIVE STATEMENT
82 y/o female with PMH hypothyroidism, asthma presenting to ED for chest discomfort x 1 day. Pt states "I wasn't feeling right today. I went to the urgent care and they told me that I have EKG changes. I can't describe this discomfort but it's just all over my chest." Upon exam pt A&Ox2 gross neuro intact, lungs cta bilaterally, no difficulty speaking in complete sentences, s1s2 heart sounds heard,  abdomen soft nontender nondistended, skin intact. Pt denies chest pain, sob, ha, n/v/d, abdominal pain, f/c, urinary symptoms, hematuria. EKG done & given to MD. Labs drawn & sent. 82 y/o female with PMH hypothyroidism, asthma presenting to ED for chest discomfort x 1 day. Pt states "I wasn't feeling right today. I went to the PMD and they told me that I have EKG changes. I can't describe this discomfort but it's just all over my chest." Upon exam pt A&Ox2 gross neuro intact, lungs cta bilaterally, no difficulty speaking in complete sentences, s1s2 heart sounds heard,  abdomen soft nontender nondistended, skin intact. Pt denies chest pain, sob, ha, n/v/d, abdominal pain, f/c, urinary symptoms, hematuria. EKG done & given to MD. Labs drawn & sent.

## 2019-03-16 NOTE — ED ADULT NURSE NOTE - NSIMPLEMENTINTERV_GEN_ALL_ED
Implemented All Fall Risk Interventions:  [place] to call system. Call bell, personal items and telephone within reach. Instruct patient to call for assistance. Room bathroom lighting operational. Non-slip footwear when patient is off stretcher. Physically safe environment: no spills, clutter or unnecessary equipment. Stretcher in lowest position, wheels locked, appropriate side rails in place. Provide visual cue, wrist band, yellow gown, etc. Monitor gait and stability. Monitor for mental status changes and reorient to person, place, and time. Review medications for side effects contributing to fall risk. Reinforce activity limits and safety measures with patient and family.

## 2019-03-16 NOTE — ED ADULT NURSE NOTE - DOES PATIENT HAVE ADVANCE DIRECTIVE
Peripheral    Patient location during procedure: pre-op   Block not for primary anesthetic.  Reason for block: at surgeon's request and post-op pain management   Post-op Pain Location: Left patella   Start time: 9/29/2017 8:30 AM  Timeout: 9/29/2017 8:30 AM   End time: 9/29/2017 8:50 AM  Staffing  Anesthesiologist: SERJIO GUZMAN  Performed: anesthesiologist   Preanesthetic Checklist  Completed: patient identified, site marked, surgical consent, pre-op evaluation, timeout performed, IV checked, risks and benefits discussed and monitors and equipment checked  Peripheral Block  Patient position: supine  Prep: ChloraPrep and site prepped and draped  Patient monitoring: heart rate, cardiac monitor, continuous pulse ox, continuous capnometry and frequent blood pressure checks  Block type: femoral  Laterality: left  Injection technique: continuous  Needle  Needle type: Tuohy   Needle gauge: 17 G  Needle length: 3.5 in  Needle localization: anatomical landmarks and ultrasound guidance  Catheter type: spring wound  Catheter size: 19 G  Test dose: lidocaine 1.5% with Epi 1-to-200,000 and negative   -ultrasound image captured on disc.  Assessment  Injection assessment: negative aspiration, negative parasthesia and local visualized surrounding nerve  Paresthesia pain: none  Heart rate change: no  Slow fractionated injection: yes  Medications:  Bolus administered: 30 mL of 0.5 ropivacaine  Epinephrine added: none  Additional Notes  VSS.  [facility] RN monitoring vitals throughout procedure.  Patient tolerated procedure well.                No

## 2019-03-16 NOTE — ED ADULT NURSE NOTE - ED STAT RN HANDOFF DETAILS 3
Report to Manasa Bunn RN oncoming RN. Constant observation intact. Pt remains confused. ate lunch. Voiding well. No c/o chest pain. Fall risk precautions maintained

## 2019-03-16 NOTE — ED PROVIDER NOTE - OBJECTIVE STATEMENT
84 y/o female hx dementia, lives at home alone who presents from urgent care for abnormal ekg. contacted patients SHAGGY Collado ([phone]) who states she was apparently complaining of chest pressure and trouble breathing this morning so her neighbor brought her to  where they did an EKG and sent her here. patients sister states she is very concerned about her living at home, she has been trying to find her placement somewhere but states her sister has been unwilling. patient denying current symptoms right now.

## 2019-03-17 DIAGNOSIS — Z60.2 PROBLEMS RELATED TO LIVING ALONE: ICD-10-CM

## 2019-03-17 DIAGNOSIS — E03.9 HYPOTHYROIDISM, UNSPECIFIED: ICD-10-CM

## 2019-03-17 DIAGNOSIS — J45.909 UNSPECIFIED ASTHMA, UNCOMPLICATED: ICD-10-CM

## 2019-03-17 DIAGNOSIS — F03.90 UNSPECIFIED DEMENTIA WITHOUT BEHAVIORAL DISTURBANCE: ICD-10-CM

## 2019-03-17 DIAGNOSIS — R94.31 ABNORMAL ELECTROCARDIOGRAM [ECG] [EKG]: ICD-10-CM

## 2019-03-17 DIAGNOSIS — R06.02 SHORTNESS OF BREATH: ICD-10-CM

## 2019-03-17 LAB
TROPONIN T, HIGH SENSITIVITY RESULT: 12 NG/L — SIGNIFICANT CHANGE UP (ref 0–51)
TROPONIN T, HIGH SENSITIVITY RESULT: 13 NG/L — SIGNIFICANT CHANGE UP (ref 0–51)
TSH SERPL-MCNC: 2.24 UIU/ML — SIGNIFICANT CHANGE UP (ref 0.27–4.2)

## 2019-03-17 PROCEDURE — 12345: CPT | Mod: NC

## 2019-03-17 PROCEDURE — 99223 1ST HOSP IP/OBS HIGH 75: CPT

## 2019-03-17 RX ORDER — ATORVASTATIN CALCIUM 80 MG/1
10 TABLET, FILM COATED ORAL AT BEDTIME
Qty: 0 | Refills: 0 | Status: DISCONTINUED | OUTPATIENT
Start: 2019-03-17 | End: 2019-03-18

## 2019-03-17 RX ORDER — LEVOTHYROXINE SODIUM 125 MCG
50 TABLET ORAL DAILY
Qty: 0 | Refills: 0 | Status: DISCONTINUED | OUTPATIENT
Start: 2019-03-17 | End: 2019-03-18

## 2019-03-17 RX ORDER — ALBUTEROL 90 UG/1
2 AEROSOL, METERED ORAL EVERY 6 HOURS
Qty: 0 | Refills: 0 | Status: DISCONTINUED | OUTPATIENT
Start: 2019-03-17 | End: 2019-03-18

## 2019-03-17 RX ORDER — MONTELUKAST 4 MG/1
10 TABLET, CHEWABLE ORAL DAILY
Qty: 0 | Refills: 0 | Status: DISCONTINUED | OUTPATIENT
Start: 2019-03-17 | End: 2019-03-18

## 2019-03-17 RX ORDER — BUDESONIDE AND FORMOTEROL FUMARATE DIHYDRATE 160; 4.5 UG/1; UG/1
2 AEROSOL RESPIRATORY (INHALATION)
Qty: 0 | Refills: 0 | Status: DISCONTINUED | OUTPATIENT
Start: 2019-03-17 | End: 2019-03-18

## 2019-03-17 RX ADMIN — BUDESONIDE AND FORMOTEROL FUMARATE DIHYDRATE 2 PUFF(S): 160; 4.5 AEROSOL RESPIRATORY (INHALATION) at 19:01

## 2019-03-17 RX ADMIN — BUDESONIDE AND FORMOTEROL FUMARATE DIHYDRATE 2 PUFF(S): 160; 4.5 AEROSOL RESPIRATORY (INHALATION) at 06:36

## 2019-03-17 RX ADMIN — Medication 50 MICROGRAM(S): at 06:36

## 2019-03-17 RX ADMIN — MONTELUKAST 10 MILLIGRAM(S): 4 TABLET, CHEWABLE ORAL at 15:34

## 2019-03-17 SDOH — SOCIAL STABILITY - SOCIAL INSECURITY: PROBLEMS RELATED TO LIVING ALONE: Z60.2

## 2019-03-17 NOTE — ED ADULT NURSE REASSESSMENT NOTE - NS ED NURSE REASSESS COMMENT FT1
Report taken from Stephanie TRIPP in [facility] Pt ambulating near [facility] with constant observation in progress. Admitted and awaiting bed placement.

## 2019-03-17 NOTE — ED ADULT NURSE REASSESSMENT NOTE - NS ED NURSE REASSESS COMMENT FT1
0945 Breakfast given. aspiration precautions maintained. No c/o at present. Awaiting social work consult. EKG done as ordered

## 2019-03-17 NOTE — ED ADULT NURSE REASSESSMENT NOTE - NS ED NURSE REASSESS COMMENT FT1
0700 report received from night nurse Yumiko Kovacs RN. TBA tele. No bed yet. A&Ox2. Doesn't know what year it is and why she is in the hospital. Awaiting social work consult. Voiding well. color pink. skin W&D. Lungs clear. Abd soft. Afib on monitor. denies chest pain, palp, SOB or dizziness. IVL intact without sx of infilt left wrist. ambulates to bathroom with assistance FMLA form faxed to [facility]. Original mailed to patient. Request complete.

## 2019-03-17 NOTE — H&P ADULT - NSHPSOCIALHISTORY_GEN_ALL_CORE
Never smoker  No ETOH or drug use  Retired     Sister is HCP, lives in [place]. Sister Heaven would prefer patient lives near her in [place] for patient's safety. Heaven also concerned because patient continues to drive.

## 2019-03-17 NOTE — ED ADULT NURSE REASSESSMENT NOTE - NS ED NURSE REASSESS COMMENT FT1
1025 Pt confused. awaiting bed. Pt spoke with her daughter on telephone. Stretcher near nurses station. Pt walking back and forth near nurses station. A&Ox2. denies chest pain, palp or SOB

## 2019-03-17 NOTE — H&P ADULT - ASSESSMENT
82 yo F w/ hx hypothyroidism, HLD, presents after being sent in from urgent care for new EKG changes in setting of new shortness of breath. 83F PMH hypothyroidism, HLD, presents after being sent in from urgent care for new EKG changes in setting of new shortness of breath, admitted to evaluate for ACS and out of concern for patient safety at home.

## 2019-03-17 NOTE — CHART NOTE - NSCHARTNOTEFT_GEN_A_CORE
Patient was seen in [place] and she has been pacing on the ED floor and offers no complaints.  NO chest pain , no SOB.  Labs results are reviewed .  Awaiting on  evaluation since patient is reported to be unable to care for self.  Continue current management and closely monitor patient.           Delicia Askew  Hospitalist   [phone]

## 2019-03-17 NOTE — ED ADULT NURSE REASSESSMENT NOTE - NS ED NURSE REASSESS COMMENT FT1
Patient becoming increasingly agitated. Medicine NP contacted, will come assess patient. Patient VSS at this time. 1:1 at bedside for patient's safety.

## 2019-03-17 NOTE — ED ADULT NURSE REASSESSMENT NOTE - NS ED NURSE REASSESS COMMENT FT1
Received report from JOSEE Goel. Patient sitting on side of bed, eating dinner. Awaiting tele bed. 1:1 at bedside.

## 2019-03-17 NOTE — ED ADULT NURSE REASSESSMENT NOTE - NS ED NURSE REASSESS COMMENT FT1
1400 Remains confused. Walking into other pt's rooms. Charge nurse notified. Constant obsevation initiated

## 2019-03-17 NOTE — H&P ADULT - NSHPPHYSICALEXAM_GEN_ALL_CORE
GENERAL: No acute distress, well-developed female, appears stated age  HEAD:  Atraumatic, Normocephalic  ENT: EOMI, PERRLA, conjunctiva and sclera clear, Neck supple, No JVD, moist mucosa  CHEST/LUNG: Clear to auscultation bilaterally; No wheeze, equal breath sounds bilaterally   HEART: Regular rate and rhythm; No murmurs, rubs, or gallops  ABDOMEN: Soft, Nontender, Nondistended; Bowel sounds present, no organomegaly  EXTREMITIES:  2+ Peripheral Pulses, No clubbing, cyanosis, or edema  PSYCH: AAOx1  NEUROLOGY: non-focal, cranial nerves intact  SKIN: In tact, no lesions Vital Signs Last 24 Hrs  T(C): 36.4 (03-17-19 @ 02:32)  T(F): 97.6 (03-17-19 @ 02:32), Max: 98 (03-16-19 @ 18:38)  HR: 76 (03-17-19 @ 02:32) (76 - 77)  BP: 124/65 (03-17-19 @ 02:32)  BP(mean): --  RR: 18 (03-17-19 @ 02:32) (18 - 20)  SpO2: 98% (03-17-19 @ 02:32) (97% - 98%)    GENERAL: No acute distress, well-developed female, appears stated age, well groomed  HEAD:  Atraumatic, Normocephalic  ENT: EOMI, PERRLA, conjunctiva and sclera clear, Neck supple, No JVD, moist mucosa  CHEST/LUNG: Clear to auscultation bilaterally; No wheeze, equal breath sounds bilaterally   HEART: Regular rate and rhythm; No murmurs, rubs, or gallops  ABDOMEN: Soft, Nontender, Nondistended; Bowel sounds present, no organomegaly  EXTREMITIES:  2+ Peripheral Pulses, No clubbing, cyanosis, or edema  PSYCH: AAOx1  NEUROLOGY: non-focal, cranial nerves intact  SKIN: In tact, no lesions

## 2019-03-17 NOTE — H&P ADULT - HISTORY OF PRESENT ILLNESS
82 y/o female hx dementia, currently AO x 1 during this interview, lives at home alone who presents from urgent care for abnormal EKG.    Per patient before going to bed last night she felt "weird and cannot describe it further". She went to urgent care and they sent her to hospital. She denies HA, blurry vision, chest pain, abdominal pain, constipation, nausea or vomiting. She does remember having some SOB at this time, but does not have any complaints.   She only complains of "feeling out of sorts" after being woken up. She is unable to provide further history. She advised me to please call her sister.     I spoke with SHAGGY Collado ([phone]) who states she was apparently complaining of chest pressure and trouble breathing this morning so her neighbor brought her to  where they did an EKG and sent her here. patients sister states she is very concerned about her living at home, she has been trying to find her placement somewhere but states her sister has been unwilling. Of note last Saturday patient called her sister completely incoherent, and needed explanation on how to read calendar. She lives alone but has  some of the time. Another neighbor called sister and said she is very confused. Heaven made an appointment for her to see a doctor, who said perhaps this could be a UTI (Dr. Ny) but they could not get urine sample and so no abx were given. She always had history of asthma and "breathing issues", allergies to animal dander. She went to neighbor and said she had breathing issues. Another neighbor took her to urgent care, EKG showed " one blip" and she was told to go to ED.    Sister is concerned that she lives alone, and would like her to move to [place], but pt is resistant to this.    PCP is Dr. Henrry Price 84 y/o female hx dementia, currently AO x 1 during this interview, lives at home alone who presents from urgent care for abnormal EKG.    Per patient before going to bed last night she felt "weird and cannot describe it further". She went to urgent care and they sent her to hospital. She denies HA, blurry vision, chest pain, abdominal pain, constipation, nausea or vomiting. She does remember having some SOB at this time, but does not have any complaints.   She only complains of "feeling out of sorts" after being woken up. She is unable to provide further history. She advised me to please call her sister.     I spoke with SHAGGY Collado ([phone]) who states she was apparently complaining of chest pressure and trouble breathing this morning so her neighbor brought her to  where they did an EKG and sent her here. patients sister states she is very concerned about her living at home, she has been trying to find her placement somewhere but states her sister has been unwilling. Of note last Saturday patient called her sister completely incoherent, and needed explanation on how to read calendar. She lives alone but has  some of the time. Another neighbor called sister and said she is very confused. Heaven made an appointment for her to see a doctor, who said perhaps this could be a UTI (Dr. Ny) but they could not get urine sample and so no abx were given. She always had history of asthma and "breathing issues", allergies to animal dander. She went to neighbor and said she had breathing issues. Another neighbor took her to urgent care, EKG showed " one blip" and she was told to go to ED.    Sister is concerned that she lives alone, and would like her to move to [place], but pt is resistant to this.    PCP is Dr. Henrry Price, geriatric doctor is Dr. Schulte. 84 y/o female hx dementia, currently AO x 1 during this interview, lives at home alone who presents from urgent care for abnormal EKG.    Per patient before going to bed last night she felt "weird and cannot describe it further". She went to urgent care and they sent her to hospital. She denies HA, blurry vision, chest pain, abdominal pain, constipation, nausea or vomiting. She does remember having some SOB at this time, but does not have any complaints.   She only complains of "feeling out of sorts" after being woken up. She is unable to provide further history. She advised me to please call her sister.     I spoke with SHAGGY Collado ([phone]) who states she was apparently complaining of chest pressure and trouble breathing this morning so her neighbor brought her to  where they did an EKG and sent her here. patients sister states she is very concerned about her living at home, she has been trying to find her placement somewhere but states her sister has been unwilling. Of note last Saturday patient called her sister completely incoherent, and needed explanation on how to read calendar. She lives alone but has  some of the time. Another neighbor called sister and said she is very confused. Heaven made an appointment for her to see a doctor, who said perhaps this could be a UTI (Dr. Ny) but they could not get urine sample and so no abx were given. She always had history of asthma and "breathing issues", allergies to animal dander. She went to neighbor and said she had breathing issues. Another neighbor took her to urgent care, EKG showed " one blip" and she was told to go to ED.    Sister is concerned that she lives alone, and would like her to move to [place], but pt is resistant to this.    PCP is Dr. Henrry Price, geriatric doctor is Dr. Schulte.     meds  advair 250 BID  albuterol- as needed   atorvastatin  dexmetasone-tobramycin eye drops  monteleukast qd  levothyroxine 50   rivastigmine 9.5 mg patch q 24 hours 82 y/o female hx dementia, currently AO x 1 during this interview, lives at home alone who presents from urgent care for abnormal EKG.    Per patient before going to bed last night she felt "weird and cannot describe it further". She went to urgent care and they sent her to hospital. She denies HA, blurry vision, chest pain, abdominal pain, constipation, nausea or vomiting. She does remember having some SOB at this time, but does not have any complaints.   She only complains of "feeling out of sorts" after being woken up. She is unable to provide further history. She advised me to please call her sister.     I spoke with SHAGGY Collado ([phone]) who states she was apparently complaining of chest pressure and trouble breathing this morning so her neighbor brought her to  where they did an EKG and sent her here. patients sister states she is very concerned about her living at home, she has been trying to find her placement somewhere but states her sister has been unwilling. Of note last Saturday patient called her sister completely incoherent, and needed explanation on how to read calendar. She lives alone but has  some of the time. Another neighbor called sister and said she is very confused. Heaven made an appointment for her to see a doctor, who said perhaps this could be a UTI (Dr. Ny) but they could not get urine sample and so no abx were given. She always had history of asthma and "breathing issues", allergies to animal dander. She went to neighbor and said she had breathing issues. Another neighbor took her to urgent care, EKG showed " one blip" and she was told to go to ED.    Sister is concerned that she lives alone, and would like her to move to [place], but pt is resistant to this.    PCP is Dr. Henrry Price, geriatric doctor is Dr. Schulte.     meds  advair 250 BID  albuterol- as needed   atorvastatin 10mg (started in Jan 2019 for elevated cholesterol)  dexmetasone-tobramycin eye drops  monteleukast qd  levothyroxine 50   rivastigmine 9.5 mg patch q 24 hours

## 2019-03-17 NOTE — H&P ADULT - NSHPREVIEWOFSYSTEMS_GEN_ALL_CORE
REVIEW OF SYSTEMS:    CONSTITUTIONAL: No weakness, fevers or chills  EYES/ENT: No visual changes;  No vertigo or throat pain   NECK: No pain or stiffness  RESPIRATORY: No cough, wheezing, hemoptysis; No shortness of breath  CARDIOVASCULAR: No chest pain or palpitations  GASTROINTESTINAL: No abdominal or epigastric pain. No nausea, vomiting, or hematemesis; No diarrhea or constipation. No melena or hematochezia.  GENITOURINARY: No dysuria, frequency or hematuria  NEUROLOGICAL: No numbness or weakness  SKIN: No itching, burning, rashes, or lesions   All other review of systems is negative unless indicated above. Patient unable to provide full and accurate review of symptoms due to dementia.

## 2019-03-17 NOTE — H&P ADULT - NSHPLABSRESULTS_GEN_ALL_CORE
LABS:  CBC Full  -  ( 16 Mar 2019 21:54 )  WBC Count : 9.2 K/uL  Hemoglobin : 13.8 g/dL  Hematocrit : 41.7 %  Platelet Count - Automated : 244 K/uL  Mean Cell Volume : 90.2 fl  Mean Cell Hemoglobin : 29.8 pg  Mean Cell Hemoglobin Concentration : 33.0 gm/dL  Auto Neutrophil # : 6.2 K/uL  Auto Lymphocyte # : 2.0 K/uL  Auto Monocyte # : 0.9 K/uL  Auto Eosinophil # : 0.1 K/uL  Auto Basophil # : 0.1 K/uL  Auto Neutrophil % : 67.4 %  Auto Lymphocyte % : 21.2 %  Auto Monocyte % : 9.8 %  Auto Eosinophil % : 0.8 %  Auto Basophil % : 0.8 %    143    |  106    |  19     ----------------------------<  106<H>    16 Mar 2019 21:54  4.1     |  24     |  0.80         Ca 9.8           16 Mar 2019 21:54        TPro  6.9    /  Alb  4.3    /  TBili  0.6    /  DBili  x      /  AST  20     /  ALT  14     /  AlkPhos  68     16 Mar 2019 21:54    PT/INR - ( 16 Mar 2019 22:06 )   PT: 11.9 ;   INR: 1.03          PTT - ( 16 Mar 2019 22:06 )  PTT:26.4<L>    Urinalysis Basic - ( 16 Mar 2019 23:02 )    Color: Yellow / Appearance: Clear / S.031 / pH: x  Gluc: x / Ketone: Negative  / Bili: Negative / Urobili: Negative   Blood: x / Protein: Trace / Nitrite: Negative   Leuk Esterase: Moderate / RBC: 4 /hpf / WBC 9 /HPF   Sq Epi: x / Non Sq Epi: 4 /hpf / Bacteria: Negative    EKG: urgent care: new TWI in V2, resolved on EKG in ED. EKG, labs, and imaging personally reviewed and interpreted.     EKG in : urgent care: new TWI in V2, possible sub-millimeter STD in V4-V6 (however baseline is poor with some artifact)  EKG at [facility]: NSR, no TWI in V2 or STD in V4-V6.     LABS:  CBC Full  -  ( 16 Mar 2019 21:54 )  WBC Count : 9.2 K/uL  Hemoglobin : 13.8 g/dL  Hematocrit : 41.7 %  Platelet Count - Automated : 244 K/uL  Mean Cell Volume : 90.2 fl  Mean Cell Hemoglobin : 29.8 pg  Mean Cell Hemoglobin Concentration : 33.0 gm/dL  Auto Neutrophil # : 6.2 K/uL  Auto Lymphocyte # : 2.0 K/uL  Auto Monocyte # : 0.9 K/uL  Auto Eosinophil # : 0.1 K/uL  Auto Basophil # : 0.1 K/uL  Auto Neutrophil % : 67.4 %  Auto Lymphocyte % : 21.2 %  Auto Monocyte % : 9.8 %  Auto Eosinophil % : 0.8 %  Auto Basophil % : 0.8 %    143    |  106    |  19     ----------------------------<  106<H>    16 Mar 2019 21:54  4.1     |  24     |  0.80     Ca 9.8           16 Mar 2019 21:54    TPro  6.9    /  Alb  4.3    /  TBili  0.6    /  DBili  x      /  AST  20     /  ALT  14     /  AlkPhos  68     16 Mar 2019 21:54    PT/INR - ( 16 Mar 2019 22:06 )   PT: 11.9 ;   INR: 1.03     PTT - ( 16 Mar 2019 22:06 )  PTT:26.4<L>    Urinalysis Basic - ( 16 Mar 2019 23:02 )    Color: Yellow / Appearance: Clear / S.031 / pH: x  Gluc: x / Ketone: Negative  / Bili: Negative / Urobili: Negative   Blood: x / Protein: Trace / Nitrite: Negative   Leuk Esterase: Moderate / RBC: 4 /hpf / WBC 9 /HPF   Sq Epi: x / Non Sq Epi: 4 /hpf / Bacteria: Negative    Care discussed with other providers: Yes  Consult notes reviewed: Yes  Outpatient records reviewed: Yes

## 2019-03-17 NOTE — H&P ADULT - NSICDXPROBLEM_GEN_ALL_CORE_FT
PROBLEM DIAGNOSES  Problem: EKG abnormality  Assessment and Plan:     Problem: Shortness of breath  Assessment and Plan:     Problem: Hypothyroidism  Assessment and Plan:     Problem: Asthma  Assessment and Plan: PROBLEM DIAGNOSES  Problem: EKG abnormality  Assessment and Plan: -TWI resolved on repeat EKG in ED  -patient without CP or SOB at present  -repeat CE's in AM, repeat EKG if patient becomes symptomatic  -unlikely ischemic cause of symptoms    Problem: Shortness of breath  Assessment and Plan: -resolved  -per sister, hx asthma. Transient SOB perhaps due to not taking advair as prescribed  -c/w advair while inpt    Problem: Hypothyroidism  Assessment and Plan: -c/w levothyroxine    Problem: Asthma  Assessment and Plan: -c/w advair PROBLEM DIAGNOSES  Problem: EKG abnormality  Assessment and Plan: -TWI, borderline STD resolved on repeat EKG in ED    -patient without CP or SOB at present    -repeat CE's in AM, repeat EKG if patient becomes symptomatic    -unlikely ischemic cause of symptoms; would discuss with patient's PMD/cardiologist Dr. Price benefit of further ischemic testing as an outpatient. As long as troponins remain negative and she is asymptomatic, likely safe to be deferred to the outpatient setting.     Problem: Shortness of breath  Assessment and Plan: -resolved    -per sister, hx asthma. Transient SOB suspect related to poor adherence to asthma inhalers (advair and albuterol)    -c/w advair while inpt  --CXR clear    Problem: Hypothyroidism  Assessment and Plan: -c/w levothyroxine. Check TSH.    Problem: Asthma  Assessment and Plan: -c/w advair    Problem: Dementia  Assessment and Plan: --per reports from sister, seems the patient's dementia has been worsening gradually over the past few months and Heaven is very concerned about the patient's safety at home.  --c/w rivastigmine patch.     Problem: Living alone  Assessment and Plan: --patient's sister and HCP expressing concern for patient's safety living alone, requestion social work evaluation to help determine a safe discharge plan.

## 2019-03-18 ENCOUNTER — TRANSCRIPTION ENCOUNTER (OUTPATIENT)
Age: 84
End: 2019-03-18

## 2019-03-18 VITALS
TEMPERATURE: 98 F | SYSTOLIC BLOOD PRESSURE: 133 MMHG | DIASTOLIC BLOOD PRESSURE: 71 MMHG | OXYGEN SATURATION: 97 % | RESPIRATION RATE: 18 BRPM | HEART RATE: 76 BPM

## 2019-03-18 DIAGNOSIS — E78.5 HYPERLIPIDEMIA, UNSPECIFIED: ICD-10-CM

## 2019-03-18 PROCEDURE — 85730 THROMBOPLASTIN TIME PARTIAL: CPT

## 2019-03-18 PROCEDURE — 93005 ELECTROCARDIOGRAM TRACING: CPT

## 2019-03-18 PROCEDURE — 80053 COMPREHEN METABOLIC PANEL: CPT

## 2019-03-18 PROCEDURE — 85027 COMPLETE CBC AUTOMATED: CPT

## 2019-03-18 PROCEDURE — 99239 HOSP IP/OBS DSCHRG MGMT >30: CPT

## 2019-03-18 PROCEDURE — 84443 ASSAY THYROID STIM HORMONE: CPT

## 2019-03-18 PROCEDURE — 99285 EMERGENCY DEPT VISIT HI MDM: CPT

## 2019-03-18 PROCEDURE — 81001 URINALYSIS AUTO W/SCOPE: CPT

## 2019-03-18 PROCEDURE — 36415 COLL VENOUS BLD VENIPUNCTURE: CPT

## 2019-03-18 PROCEDURE — 94640 AIRWAY INHALATION TREATMENT: CPT

## 2019-03-18 PROCEDURE — 84484 ASSAY OF TROPONIN QUANT: CPT

## 2019-03-18 PROCEDURE — 85610 PROTHROMBIN TIME: CPT

## 2019-03-18 PROCEDURE — 71045 X-RAY EXAM CHEST 1 VIEW: CPT

## 2019-03-18 RX ORDER — LEVOTHYROXINE SODIUM 125 MCG
1 TABLET ORAL
Qty: 0 | Refills: 0 | COMMUNITY

## 2019-03-18 RX ORDER — RIVASTIGMINE 4.6 MG/24H
0 PATCH, EXTENDED RELEASE TRANSDERMAL
Qty: 30 | Refills: 0 | COMMUNITY

## 2019-03-18 RX ORDER — ATORVASTATIN CALCIUM 80 MG/1
0 TABLET, FILM COATED ORAL
Qty: 30 | Refills: 0 | COMMUNITY

## 2019-03-18 RX ORDER — MONTELUKAST 4 MG/1
0 TABLET, CHEWABLE ORAL
Qty: 90 | Refills: 0 | COMMUNITY

## 2019-03-18 RX ORDER — ATORVASTATIN CALCIUM 80 MG/1
1 TABLET, FILM COATED ORAL
Qty: 0 | Refills: 0 | COMMUNITY
Start: 2019-03-18

## 2019-03-18 RX ORDER — LEVOTHYROXINE SODIUM 125 MCG
0 TABLET ORAL
Qty: 90 | Refills: 0 | COMMUNITY

## 2019-03-18 RX ORDER — ALBUTEROL 90 UG/1
0 AEROSOL, METERED ORAL
Qty: 8.5 | Refills: 0 | COMMUNITY

## 2019-03-18 RX ADMIN — Medication 50 MICROGRAM(S): at 09:23

## 2019-03-18 NOTE — DISCHARGE NOTE PROVIDER - HOSPITAL COURSE
83F PMH hypothyroidism, HLD, presents after being sent in from urgent care for new EKG changes in setting of new shortness of breath, admitted to evaluate for ACS and out of concern for patient safety at home. In the ED, initial EKG with TWI, borderline STD, resolved on repeat EKG in ED. Trop x 3 negative, deemed no further cardiology work-up necessary inpatient. Transient SOB resolved, likely attributed to poor inhaler compliance outpatient. Metabolic Encephalopathy work-up unrevealing, with no source of infection identified. Pt remained hemodynamically stable during hospital course, afebrile, CV stable, mental status at baseline. Pt evaluated by NICOLE with sister at bedside, deemed safe disposition to live with her in [place]. Pt medically stable for disposition home as advised, with either routine follow-ups with PCP & Cardiologist here in [place] or with new Geriatrician & Cardiologist in [place].

## 2019-03-18 NOTE — PROGRESS NOTE ADULT - ASSESSMENT
83F PMH hypothyroidism, HLD, presents after being sent in from urgent care for new EKG changes in setting of new shortness of breath, admitted to evaluate for ACS and out of concern for patient safety at home. EKG changes have normalized with negative serial troponin testing. She is without symptoms at present and the patient's sister would like for her to return home today, with eventual plan to move to [place] with her and establish care there.

## 2019-03-18 NOTE — DISCHARGE NOTE PROVIDER - CARE PROVIDERS DIRECT ADDRESSES
,DirectAddress_Unknown,[email or web address]

## 2019-03-18 NOTE — DISCHARGE NOTE PROVIDER - PROVIDER TOKENS
PROVIDER:[TOKEN:[[id number]],FOLLOWUP:[Routine]],PROVIDER:[TOKEN:[[id number]],FOLLOWUP:[Routine]]

## 2019-03-18 NOTE — DISCHARGE NOTE PROVIDER - NSDCCPCAREPLAN_GEN_ALL_CORE_FT
PRINCIPAL DISCHARGE DIAGNOSIS  Diagnosis: Abnormal EKG  Assessment and Plan of Treatment: Now resolved. Deemed cardiovascularly stable. Troponins x 3 negative.  Asymptomatic  Continue outpatient management with either Dr. Henrry Price (Cardiologist in [place]) or new specialist in [place].      SECONDARY DISCHARGE DIAGNOSES  Diagnosis: Acute asthma exacerbation  Assessment and Plan of Treatment: Resolved, with no evidence of acute respiratory distress-no hypoxia or hypercapnia.  Continue inhaler medications as advised  Follow-up with your PCP-Dr. Schulte in [place] or new PCP in [place]    Diagnosis: Hypothyroidism  Assessment and Plan of Treatment: TSH level normal, continue with Synthroid as advised.    Diagnosis: Dementia  Assessment and Plan of Treatment: Continue with current medication therapy as advised. Maintain slee-wake cycles, and frequent reorientation.  Either follow-up with your Geriatrician-Dr. Schulte in [place] or new PCP in [place]    Diagnosis: Living alone  Assessment and Plan of Treatment: Deemed safe disposition home with sister.

## 2019-03-18 NOTE — PROGRESS NOTE ADULT - SUBJECTIVE AND OBJECTIVE BOX
[facility] Division of Hospital Medicine  Judah Bass MD  Pager ([facility], 1R-9D): [phone]  Other Times:  [phone]    Patient is a 83y old  Female who presents with a chief complaint of "feeling weird" (17 Mar 2019 03:17)    SUBJECTIVE / OVERNIGHT EVENTS: Patient seen and examined with sister at bedside. Patient has no complaints at this time other than "pain", but unable to specify where. She has been ambulating about the ED without difficulty, with the assistance of a sitter. Denies chest pain, SOB, nausea, vomiting, fever or chills.    MEDICATIONS  (STANDING):  atorvastatin 10 milliGRAM(s) Oral at bedtime  buDESOnide 160 MICROgram(s)/formoterol 4.5 MICROgram(s) Inhaler 2 Puff(s) Inhalation two times a day  levothyroxine 50 MICROGram(s) Oral daily  montelukast 10 milliGRAM(s) Oral daily    MEDICATIONS  (PRN):  ALBUTerol    90 MICROgram(s) HFA Inhaler 2 Puff(s) Inhalation every 6 hours PRN Shortness of Breath and/or Wheezing      CAPILLARY BLOOD GLUCOSE        I&O's Summary      PHYSICAL EXAM:  Vital Signs Last 24 Hrs  T(C): 36.8 (18 Mar 2019 08:17), Max: 36.8 (18 Mar 2019 08:17)  T(F): 98.2 (18 Mar 2019 08:17), Max: 98.2 (18 Mar 2019 08:17)  HR: 76 (18 Mar 2019 08:17) (76 - 84)  BP: 133/71 (18 Mar 2019 08:17) (130/74 - 151/77)  BP(mean): --  RR: 18 (18 Mar 2019 08:17) (16 - 18)  SpO2: 97% (18 Mar 2019 08:17) (95% - 99%)  GENERAL: Frail appearing woman, older than stated age, in no apparent distress  HEAD:  Atraumatic, Normocephalic  EYES: EOMI, conjunctiva and sclera clear  NECK: Supple, No JVD  CHEST/LUNG: Clear to auscultation bilaterally; No wheeze  HEART: Regular rate and rhythm; No murmurs, rubs, or gallops  ABDOMEN: Soft, Nontender, Nondistended; Bowel sounds present  EXTREMITIES:  2+ Peripheral Pulses, No clubbing, cyanosis, or edema  PSYCH: A+O to person, place, year. Easily redirectable, follows commands.  NEUROLOGY: non-focal, ambulating without difficulty    LABS:                        13.8   9.2   )-----------( 244      ( 16 Mar 2019 21:54 )             41.7     -    143  |  106  |  19  ----------------------------<  106<H>  4.1   |  24  |  0.80    Ca    9.8      16 Mar 2019 21:54    TPro  6.9  /  Alb  4.3  /  TBili  0.6  /  DBili  x   /  AST  20  /  ALT  14  /  AlkPhos  68  -    PT/INR - ( 16 Mar 2019 22:06 )   PT: 11.9 sec;   INR: 1.03 ratio         PTT - ( 16 Mar 2019 22:06 )  PTT:26.4 sec      Urinalysis Basic - ( 16 Mar 2019 23:02 )    Color: Yellow / Appearance: Clear / S.031 / pH: x  Gluc: x / Ketone: Negative  / Bili: Negative / Urobili: Negative   Blood: x / Protein: Trace / Nitrite: Negative   Leuk Esterase: Moderate / RBC: 4 /hpf / WBC 9 /HPF   Sq Epi: x / Non Sq Epi: 4 /hpf / Bacteria: Negative      RADIOLOGY & ADDITIONAL TESTS:    Imaging Personally Reviewed:  EKG 3/17/19 personally reviewed: NSR at 65 bpm. No ST-T changes    Consultant(s) Notes Reviewed:      Care Discussed with Consultants/Other Providers:

## 2019-03-18 NOTE — DISCHARGE NOTE PROVIDER - CARE PROVIDER_API CALL
Henrry Price)  Cardiovascular Disease; Internal Medicine  [address], [address]  [place] [address]  Phone: [phone]  Fax: [phone]  Follow Up Time: Routine    Nancie Schulte)  Geriatric Medicine; Internal Medicine  [address]  [place] [address]  Phone: [phone]  Fax: [phone]  Follow Up Time: Routine

## 2019-03-18 NOTE — DISCHARGE NOTE NURSING/CASE MANAGEMENT/SOCIAL WORK - NSDCDPATPORTLINK_GEN_ALL_CORE
You can access the [facility] Patient Portal, offered by [facility], by registering with the following website: [email or web address]

## 2019-03-20 ENCOUNTER — TRANSCRIPTION ENCOUNTER (OUTPATIENT)
Age: 84
End: 2019-03-20

## 2019-04-17 ENCOUNTER — APPOINTMENT (OUTPATIENT)
Dept: GERIATRICS | Facility: CLINIC | Age: 84
End: 2019-04-17

## 2020-05-20 ENCOUNTER — HOSPITAL ENCOUNTER (EMERGENCY)
Dept: HOSPITAL 27 - EMS | Age: 85
Discharge: HOME | End: 2020-05-20
Payer: MEDICARE

## 2020-05-20 VITALS — BODY MASS INDEX: 22.13 KG/M2 | WEIGHT: 120.26 LBS | HEIGHT: 62 IN

## 2020-05-20 VITALS — DIASTOLIC BLOOD PRESSURE: 80 MMHG | SYSTOLIC BLOOD PRESSURE: 160 MMHG
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
## 2020-05-20 DIAGNOSIS — S52.032A: Primary | ICD-10-CM
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
## 2020-05-20 DIAGNOSIS — F03.90: ICD-10-CM

## 2020-05-20 PROCEDURE — 12002 RPR S/N/AX/GEN/TRNK2.6-7.5CM: CPT

## 2020-05-20 PROCEDURE — 73080 X-RAY EXAM OF ELBOW: CPT

## 2020-05-20 PROCEDURE — 96372 THER/PROPH/DIAG INJ SC/IM: CPT

## 2020-05-20 PROCEDURE — 70450 CT HEAD/BRAIN W/O DYE: CPT

## 2020-05-20 PROCEDURE — 99285 EMERGENCY DEPT VISIT HI MDM: CPT

## 2022-05-26 NOTE — PATIENT PROFILE ADULT - NSASFUNCLEVELADLTRANSFER_GEN_A_NUR
Called Dr. Watters and verified she saw cbc and cmp results.  No new orders at this time. Continue to monitor blood pressures.    2 = assistive person

## 2022-08-11 NOTE — DISCHARGE NOTE PROVIDER - PROVIDER RX CONTACT NUMBER
[FreeTextEntry1] : Annita is now 4 weeks post op from lap g tube.  Current tube is a good fit, turns easily.  Her umbilical stitch granuloma is draining, without s/s of infection.  I applied silver nitrate to the peristomal area and umbilicus.  She tolerated the application w no adverse reactions.  They are aware do not replace the tube if dislodges until they have been taught.  They can f/u at 6 weeks post op for 6 weeks post op teaching.  Can stop warm compresses to umbilicus.  Post op expectations discussed. 
[phone]
